# Patient Record
(demographics unavailable — no encounter records)

---

## 2024-10-18 NOTE — DISCUSSION/SUMMARY
[FreeTextEntry1] : 15 y/o with PMH of NOLA and constipation, presented to [facility] with abdominal pain x 1 hour in school Physical exam findings reviewed: constipation likely. Pt states that he has Miralax at home and has stopped taking it for approximately 2 months. Laxatives offered in clinic. Pt declined  Mom notified at [phone]. Improving diet and fluid intake promoted. Advised to seek further care for any new or worsening signs or symptoms. Pt verbalized understanding and endorsed plan. Safe discharge to class

## 2024-10-18 NOTE — REVIEW OF SYSTEMS
[Headache] : no headache [Sinus Pressure] : no sinus pressure [Sore Throat] : no sore throat [Wheezing] : no wheezing [Cough] : no cough [Appetite Changes] : no appetite changes [PO Intolerance] : PO tolerance [Gaseous] : not gaseous [Abdominal Pain] : abdominal pain [Rash] : no rash [Dysuria] : no dysuria [Polyuria] : no polyuria [Hematuria] : no hematuria [Testicle Enlargement] : no testicle enlargement [Urethral Discharge] : no urethral discharge [Negative] : Constitutional

## 2024-10-18 NOTE — PHYSICAL EXAM
[Acute Distress] : no acute distress [Erythematous Oropharynx] : nonerythematous oropharynx [Exudate] : no exudate [NL] : regular rate and rhythm, normal S1, S2 audible, no murmurs [Soft] : soft [Tender] : nontender [Distended] : nondistended [Tenderness with Palpation] : no tenderness with palpation [McBurney's point tenderness] : no McBurney's point tenderness [Rebound tenderness] : no rebound tenderness [Psoas Sign Positive] : psoas sign negative [Obturator Sign Positive] : obturator sign negative [Normotonic] : normotonic [Warm] : warm [Clear] : clear [FreeTextEntry1] : overweight [FreeTextEntry9] : Hypotonic bowel sounds. Palpable stools present on L periumbilical area.

## 2024-10-18 NOTE — HISTORY OF PRESENT ILLNESS
[de-identified] : periumbilical abdominal pain x 1 hour [FreeTextEntry6] : 15 y/o M with hx of constipation and NOLA presented to [facility] for periumbilical abdominal pain that started in school today.  Pt denies nausea, vomiting or diarrhea. He does report passing hard stool last night that required straining. He denies seeing any blood in or on the stool last night.   Diet reviewed: mostly consists of carbs and fats, low in fiber, drinks 1-2 cups of water/day

## 2024-10-30 NOTE — DISCUSSION/SUMMARY
[FreeTextEntry1] : Jose Raul presented to [facility] with periumbilical abdominal pain present all school day today. He reports frequently suffering from NOLA and constipation and takes Miralax 1-2 times a week. No constipation reported today  Differential discussed, gastritis vs gassiness likely causing current symptoms.  Advised to avoid bowel irritants, eat small frequent meals throughout the day, incorporate more fiber into diet, drink more water daily and seek further care by GI specialist for chronic concerns I attempted to call home and discuss with mother, no answer.  Pt states that his mom is a nurse and able to make GI specialty appointments for this concern  Gas relief/ Antacid offered, pt politely declined at it is past school dismissal time and he will be going home soon.  Pt instructed to get urgent medical care for any new or worsening s/sx. He expressed understanding Safe discharge from clinic

## 2024-10-30 NOTE — PHYSICAL EXAM
[EOMI] : grossly EOMI [Pink Nasal Mucosa] : pink nasal mucosa [Erythematous Oropharynx] : nonerythematous oropharynx [Cobblestoning] : no cobblestoning of posterior pharynx [Inflamed Tongue] : tongue not inflamed [Enlarged Tonsils] : tonsils not enlarged [Exudate] : no exudate [Ulcerative Lesions] : no ulcerative lesions [Soft] : soft [Distended] : nondistended [Normal Bowel Sounds] : normal bowel sounds [Hepatosplenomegaly] : no hepatosplenomegaly [Tenderness with Palpation] : tenderness with palpation [Splenomegaly] : no splenomegaly [Mass] : no mass palpable [McBurney's point tenderness] : no McBurney's point tenderness [Rebound tenderness] : no rebound tenderness [Psoas Sign Positive] : psoas sign negative [Obturator Sign Positive] : obturator sign negative [NL] : moves all extremities x4, warm, well perfused x4 [Normotonic] : normotonic [Warm] : warm [Clear] : clear [FreeTextEntry9] : pt reported tenderness to deep palpation in LUQ

## 2024-10-30 NOTE — REVIEW OF SYSTEMS
[Sore Throat] : no sore throat [Cough] : no cough [Appetite Changes] : no appetite changes [PO Intolerance] : PO tolerance [Vomiting] : no vomiting [Diarrhea] : no diarrhea [Constipation] : no constipation [Gaseous] : not gaseous [Abdominal Pain] : abdominal pain [Dysuria] : no dysuria [Polyuria] : no polyuria [Hematuria] : no hematuria [Negative] : Constitutional

## 2024-10-30 NOTE — HISTORY OF PRESENT ILLNESS
[___ Hour(s)] : [unfilled] hour(s) [Constant] : constant [FreeTextEntry7] : periumbilical  [FreeTextEntry8] : eating spicy chip [FreeTextEntry4] : eating non spicy food [de-identified] : periumbilical abdominal pain x 5 hours [FreeTextEntry6] : 15 y/o M with hx of recurrent constipation presented to [facility] for constant periumbilical abdominal pain 8/10 scale present for 5 constant hours, worsened by eating spicy food. Pt denies n/v/d/c or appetite changes.  Last stool passed this am, soft, no bright red blood present Last miralax dose: 5 days ago.  Diet: copeland and eggs + gatorade snack: corn chip  Pt denies use of any daily meds and food allergies. He denies any known sick contact. He denies any form of sexual activity, ever

## 2025-01-03 NOTE — PHYSICAL EXAM
[Symmetric Chest Wall] : symmetric chest wall [NL] : warm, clear [Tenderness] : no tenderness [Laceration] : no laceration [Ecchymosis] : no ecchymosis [Tenderness With Palpation of Chest Wall] : no tenderness with palpation of chest wall [de-identified] : CN II-XII Intact, completed tandem walk, Romberg negative

## 2025-01-03 NOTE — DISCUSSION/SUMMARY
[FreeTextEntry1] : Patient is a 15 yo M who presents to the [facility] for a frontal headache that started 50 minutes ago while sitting in class.   Reports he has had a similar headache last month, went away on its own.   Denies any trauma, falling or hitting his head, n/v, blurry vision, drug use, photophobia, or phonophobia. Does not wear glasses.   Did not eat today but drank a Gatorade, tolerated. For dinner last night he had a turkey sandwich with cheese, morillo with lettuce, tolerated Has not taken any medications for his headache since onset.  Physical exam and vitals WNL  Plan: -Snack provided and Ibuprofen 400 mg given PO, tolerated. -Counseled re: SMART headache management: sleep 8-9 hours per night, eat regular meals including breakfast, increase hydration, exercise regularly, reduce stress, and avoid triggers. -Recommended NSAIDs as needed for acute headaches greater than 5/10 in severity.  Do not use more than 2-3 times per week.  -Educated on headache diary and patient accepted.  -Patient is requesting to go home and outreach completed to mother as per request at [phone] and made aware of visit. Mother states she is unable to pick patient up from school at this time.  -Educated patient to RTC if headaches increase in severity or frequency and patient verbalized understanding.   VIS/Consent given for Influenza, HPV series, Covid Booster.

## 2025-01-03 NOTE — HISTORY OF PRESENT ILLNESS
[FreeTextEntry6] : Patient is a 15 yo M who presents to the [facility] for a frontal headache that started 50 minutes ago while sitting in class.   Reports he has had a similar headache last month, went away on its own.   Denies any trauma, falling or hitting his head, n/v, blurry vision, drug use, photophobia, or phonophobia. Does not wear glasses.   Did not eat today but drank a Gatorade, tolerated. For dinner last night he had a turkey sandwich with cheese, morillo with lettuce, tolerated Has not taken any medications for his headache since onset.  Pain 8/10.

## 2025-01-03 NOTE — REVIEW OF SYSTEMS
[Headache] : headache [Eye Discharge] : no eye discharge [Eye Redness] : no eye redness [Itchy Eyes] : no itchy eyes [Changes in Vision] : no changes in vision [Ear Pain] : no ear pain [Nasal Discharge] : no nasal discharge [Nasal Congestion] : no nasal congestion [Snoring] : no snoring [Sinus Pressure] : no sinus pressure [Sore Throat] : no sore throat [Negative] : Gastrointestinal

## 2025-02-07 NOTE — PHYSICAL EXAM
[NL] : no acute distress, alert [EOMI] : grossly EOMI [Pink Nasal Mucosa] : pink nasal mucosa [Clear to Auscultation Bilaterally] : clear to auscultation bilaterally [Regular Rate and Rhythm] : regular rate and rhythm [Normal S1, S2 audible] : normal S1, S2 audible [No Abnormal Lymph Nodes Palpated] : no abnormal lymph nodes palpated [Enlarged Tonsils] : tonsils not enlarged [Exudate] : no exudate [Tender] : nontender [Distended] : nondistended [Normal Bowel Sounds] : normal bowel sounds [McBurney's point tenderness] : no McBurney's point tenderness [Psoas Sign Positive] : psoas sign negative [FreeTextEntry9] : palpable stools present

## 2025-02-07 NOTE — REVIEW OF SYSTEMS
[Fever] : no fever [Headache] : headache [Sinus Pressure] : no sinus pressure [Sore Throat] : no sore throat [Appetite Changes] : no appetite changes [PO Intolerance] : PO tolerance [Vomiting] : no vomiting [Diarrhea] : no diarrhea [Constipation] : constipation [Abdominal Pain] : abdominal pain [Rash] : no rash

## 2025-02-07 NOTE — DISCUSSION/SUMMARY
[FreeTextEntry1] : Pt presented to [facility] for Headache and abdominal pain and constipation x 1 day. He states that his headache is resolved after taking acetaminophen 2- 3 hours ago.  Constipation:  Dietary counseling provided Stool softener offered. Pt declined. Mother notified.  Recommend increased dietary fiber, PO fluids and probiotics Management discussed with mother via phone.  Mom states that she will remind Jose Raul to continue use of Miralax as available at home.  Safe discharge to class (almost end of school day)

## 2025-02-07 NOTE — HISTORY OF PRESENT ILLNESS
[FreeTextEntry6] : Abdominal pain and constipation x 1 day Headache this am, resolved with acetaminophen taken 2-3 hours ago

## 2025-03-27 NOTE — PHYSICAL EXAM
[Erythematous Oropharynx] : erythematous oropharynx [Symmetric Chest Wall] : symmetric chest wall [NL] : warm, clear [Tenderness] : no tenderness [Laceration] : no laceration [Ecchymosis] : no ecchymosis [Swelling] : no swelling [Traumatic] : atraumatic [Pink Nasal Mucosa] : nasal mucosa not pink [Pale Nasal Mucosa] : nasal mucosa not pale [Clear Rhinorrhea] : clear rhinorrhea [Mucoid Discharge] : no mucoid discharge [Nasal Flaring] : no nasal flaring [Inflamed Nasal Mucosa] : no nasal mucosa inflammation [Hypertrophied Nasal Mucosa] : no nasal mucosa hypertrophy [Bleeding] : no bleeding [Cobblestoning] : no cobblestoning of posterior pharynx [Inflamed Gingiva] : gingiva not inflamed [Bleeding Gingiva] : gingiva not bleeding [Inflamed Tongue] : tongue not inflamed [Enlarged Tonsils] : tonsils not enlarged [Vesicles] : no vesicles [Exudate] : no exudate [Ulcerative Lesions] : no ulcerative lesions [Palate petechiae] : palate without petechiae [Tenderness With Palpation of Chest Wall] : no tenderness with palpation of chest wall [de-identified] : Mild

## 2025-03-27 NOTE — HISTORY OF PRESENT ILLNESS
[FreeTextEntry6] : Patient is a 15 yo M who presents to the [facility] for rhinorrhea, frontal headache, dry cough, and sore throat since yesterday.   He reports his mother gave him medication last night but is unaware of what it was "2 purple pills" with relief.  Denies any n/v, diarrhea, body aches, chills, chest pain, shortness of breath, fever, rash, recent travel, sick contacts.  Eating and drinking as baseline.

## 2025-03-27 NOTE — DISCUSSION/SUMMARY
[FreeTextEntry1] : Patient is a 15 yo M who presents to the [facility] for rhinorrhea, frontal headache, dry cough, and sore throat since yesterday.   He reports his mother gave him medication last night but is unaware of what it was "2 purple pills" with relief.  Denies any n/v, diarrhea, body aches, chills, chest pain, shortness of breath, fever, rash, recent travel, sick contacts.  Eating and drinking as baseline.   Plan: -Covid/Influenza/RSV PCR testing completed -(2) Acetaminophen 325 mg given PO, tolerated, declined any other medications or interventions at this time.  -Outreach completed to patients mother Ms. Peterson at [phone] on visit and patient is requesting to go home. Mother called back and stated she will pick patient up from school -Educated patient and mother on return to school guidelines, supportive care, and for new, persistent or worsening symptoms to RTC, PMD, or UC and they verbalized understanding.  -Encouraged to verbalize any questions or concerns, all questions answered at this time. -New Visions made aware of plan and belongings returned to patient.

## 2025-03-27 NOTE — REVIEW OF SYSTEMS
[Nasal Congestion] : nasal congestion [Sore Throat] : sore throat [Cough] : cough [Negative] : Genitourinary [Headache] : no headache [Eye Discharge] : no eye discharge [Eye Redness] : no eye redness [Itchy Eyes] : no itchy eyes [Changes in Vision] : no changes in vision [Ear Pain] : no ear pain [Nasal Discharge] : no nasal discharge [Snoring] : no snoring [Sinus Pressure] : no sinus pressure [Tachypnea] : not tachypneic [Wheezing] : no wheezing [Congestion] : no congestion [Shortness of Breath] : no shortness of breath

## 2025-04-09 NOTE — HISTORY OF PRESENT ILLNESS
[FreeTextEntry6] : Patient is a 15 yo M who presents to the [facility] for a frontal headache that started 3 hours ago but states it has now resolved on its own.   Now patient reports that he has abdominal pain that started at 7:15am, reports it is a non-radiating stabbing/burning pain. Pain is located at his umbilicus and right above.   Food Recall Today: Breakfast bagel with cream cheese and copeland   Yesterday  Breakfast did have anything Lunch chopped cheese Dinner fried rice with chicken and broccoli  Denies any testicular pain, diarrhea, constipation, fever, neck pain, trauma, falling, being hit in the head, n/v, blurry vision, rash, sick contacts, or recent travel.   Last BM this morning and currently takes Miralax as needed. Per chart review he saw GI in November 2023 for abdominal pain. He was trialed on Pepcid and Benefiber. He was to f/u in 4-6 week but did not.

## 2025-04-09 NOTE — PHYSICAL EXAM
[Symmetric Chest Wall] : symmetric chest wall [NL] : warm, clear [Tenderness] : no tenderness [Laceration] : no laceration [Ecchymosis] : no ecchymosis [Swelling] : no swelling [Traumatic] : atraumatic [Tenderness With Palpation of Chest Wall] : no tenderness with palpation of chest wall [Soft] : soft [Distended] : nondistended [Normal Bowel Sounds] : normal bowel sounds [Hepatosplenomegaly] : no hepatosplenomegaly [Tenderness with Palpation] : tenderness with palpation [Splenomegaly] : no splenomegaly [Hepatomegaly] : no hepatomegaly [Mass] : no mass palpable [McBurney's point tenderness] : no McBurney's point tenderness [Rebound tenderness] : no rebound tenderness [Psoas Sign Positive] : psoas sign negative [Obturator Sign Positive] : obturator sign negative [FreeTextEntry9] : Mild TTP to mid upper abdomen [de-identified] : Completed single leg hop without pain

## 2025-04-09 NOTE — DISCUSSION/SUMMARY
[FreeTextEntry1] : Patient is a 15 yo M who presents to the [facility] for a frontal headache that started 3 hours ago but states it has now resolved on its own.   Now patient reports that he has abdominal pain that started at 7:15am, reports it is a non-radiating stabbing/burning pain. Pain is located at his umbilicus and right above.   Food Recall Today: Breakfast bagel with cream cheese and copeland   Yesterday  Breakfast did have anything Lunch chopped cheese Dinner fried rice with chicken and broccoli  Denies any testicular pain, diarrhea, constipation, fever, neck pain, trauma, falling, being hit in the head, n/v, blurry vision, rash, sick contacts, or recent travel.   Last BM this morning and currently takes Miralax as needed. Per chart review he saw GI in November 2023 for abdominal pain. He was trialed on Pepcid and Benefiber. He was to f/u in 4-6 week but did not.    Plan: -(2) Calcium Carbonate provided, tolerated -Educational handout provided Abdominal Pain from [facility] notes -Educated patient for new, persistent, or worsening symptoms to RTC or PMD. Encouraged to f/u with GI for re-assessment and plan of care. Contact information provided to bring home. -Encouraged to verbalize any questions or concerns, all questions answered at this time.

## 2025-04-09 NOTE — REVIEW OF SYSTEMS
[Appetite Changes] : no appetite changes [PO Intolerance] : PO tolerance [Vomiting] : no vomiting [Diarrhea] : no diarrhea [Constipation] : no constipation [Gaseous] : not gaseous [Abdominal Pain] : abdominal pain [Negative] : Genitourinary

## 2025-05-08 NOTE — REVIEW OF SYSTEMS
[Constipation] : constipation [Abdominal Pain] : abdominal pain [Fever] : no fever [Headache] : no headache [Appetite Changes] : no appetite changes [PO Intolerance] : PO tolerance [Vomiting] : no vomiting [Diarrhea] : no diarrhea [Gaseous] : not gaseous [Myalgia] : no myalgia

## 2025-05-08 NOTE — DISCUSSION/SUMMARY
[FreeTextEntry1] : Discussed patient's abdominal pain and normal physical exam at this time  Common causes of abdominal pain reviewed with patient i.e. not eating for long periods of time, gas, GE reflux, lactose intolerance, poor diet, constipation, etc  Supportive care and healthy eating habits promoted. Drink water throughout the day  Routine bowel habits encouraged  Docusate sodium offered in office. Pt declined as he can't swallow caps. Polyethylene glycol dispensed in office. Pt tolerated.   Follow up for any new or worsening signs or symptoms  Patient safely discharged to class  Recommend increased dietary fiber and probiotic. Advised using polyethylene glycol, titrating to effect.  Return if symptoms worsen or persist.

## 2025-05-08 NOTE — HISTORY OF PRESENT ILLNESS
[de-identified] : Periumbilical abdominal pain x 4 hours on and off [FreeTextEntry6] : Pt presented to [facility] for periumbilical abdominal pain x 4 hours. Pain said to be intermittent and improved with passing stool while in school.  Stool described as hard, non-bloody and required straining.  Diet review: Lunch today: left over bagel from breakfast Breakfast today: bagel with cream cheese+ copeland + [place] iced tea Dinner last night: chicken, broccoli and corn + Gatorade lunch yesterday: skipped Water use in 24 hours: 16 oz in addition to aforementioned drinks

## 2025-05-08 NOTE — PHYSICAL EXAM
[NL] : regular rate and rhythm, normal S1, S2 audible, no murmurs [Soft] : soft [Tender] : nontender [Distended] : nondistended [Normal Bowel Sounds] : abnormal bowel sounds [Tenderness with Palpation] : no tenderness with palpation [Rebound tenderness] : no rebound tenderness [FreeTextEntry9] : hypoactive bowel sounds. Palpable stools present along R periumbilical and RLQ area

## 2025-05-16 NOTE — COUNSELING
[Use of Plain Language] : use of plain language [Teach Back Method] : teach back method [Needs Reinforcement, Provided] : needs reinforcement, provided [Behavioral] : behavioral

## 2025-05-16 NOTE — CURRENT MEDS
[] : missed dose(s) of medications. Reason(s): [Doesn't  take it when feeling better] : doesn't take it when feeling better

## 2025-05-20 NOTE — CARE PLAN
[FreeTextEntry6] : needs reinforcement  [FreeTextEntry2] : take Miralax daily at breakfast  2-3 oz glasses of water a day (not carbonated beverages or sports drinks) increase fiber, suggested bran cereals, and more salads  (dislikes dried fruits) [FreeTextEntry3] : dietary management and fiber laxative as needed should solve the problem  [TextEntry] : poor patient adherence to dietary changes and MIralax use dietary counseling re interated

## 2025-05-20 NOTE — PLAN
[TextEntry] : discussed need for daily use of Miralax and increased fiber in his diet. discussed that drinking  8 oz of water (not sweetened drinks or soda) two-three times a day will help RTC in one week for followup if needed.

## 2025-05-20 NOTE — HISTORY OF PRESENT ILLNESS
[de-identified] : abdominal pain [FreeTextEntry6] : 16 yr old male with history of constipation and GERD here today with c/o stomach pain (points to lower abdomen) which improved after he had a bowel movement in the clinic bathroom.   He states he took the Miralax that was prescribed last week in the [facility] only once over the last week. Ate a piece of bread and some candy this morning. Has not eaten lunch yet. denies stressors at home or in school

## 2025-05-20 NOTE — REVIEW OF SYSTEMS
[Constipation] : constipation [Gaseous] : gaseous [Abdominal Pain] : abdominal pain [Negative] : Skin [Fever] : no fever [Change in Weight] : no change in weight [Appetite Changes] : no appetite changes [PO Intolerance] : PO tolerance [Vomiting] : no vomiting [Diarrhea] : no diarrhea [Dysuria] : no dysuria [FreeTextEntry1] : never sexually active

## 2025-05-20 NOTE — HISTORY OF PRESENT ILLNESS
[de-identified] : abdominal pain [FreeTextEntry6] : 16 yr old male with history of constipation and GERD here today with c/o stomach pain (points to lower abdomen) which improved after he had a bowel movement in the clinic bathroom.   He states he took the Miralax that was prescribed last week in the [facility] only once over the last week. Ate a piece of bread and some candy this morning. Has not eaten lunch yet. denies stressors at home or in school

## 2025-05-20 NOTE — PHYSICAL EXAM
[Soft] : soft [Warm] : warm [Clear] : clear [Tender] : nontender [Distended] : nondistended [McBurney's point tenderness] : no McBurney's point tenderness [Rebound tenderness] : no rebound tenderness [FreeTextEntry1] : ambulatory and in no distress [FreeTextEntry9] : hyperactive bowel sounds

## 2025-05-20 NOTE — DISCUSSION/SUMMARY
[FreeTextEntry1] : Discussed that Miralax should be taken every day. Discussed sources of fiber in his diet (whole grain cereals, dried fruits, vegetables salads) at every meal. Also drink at least 8 oz of water daily with meals.   Since he feels better now, sent to have lunch.